# Patient Record
Sex: MALE | Race: WHITE | NOT HISPANIC OR LATINO | Employment: FULL TIME | URBAN - METROPOLITAN AREA
[De-identification: names, ages, dates, MRNs, and addresses within clinical notes are randomized per-mention and may not be internally consistent; named-entity substitution may affect disease eponyms.]

---

## 2018-09-20 ENCOUNTER — OFFICE VISIT (OUTPATIENT)
Dept: ORTHOPEDIC SURGERY | Facility: CLINIC | Age: 28
End: 2018-09-20

## 2018-09-20 VITALS — BODY MASS INDEX: 28.67 KG/M2 | OXYGEN SATURATION: 99 % | HEIGHT: 72 IN | HEART RATE: 67 BPM | WEIGHT: 211.64 LBS

## 2018-09-20 DIAGNOSIS — M75.81 ROTATOR CUFF TENDONITIS, RIGHT: Primary | ICD-10-CM

## 2018-09-20 PROCEDURE — 99203 OFFICE O/P NEW LOW 30 MIN: CPT | Performed by: PHYSICIAN ASSISTANT

## 2018-09-20 PROCEDURE — 20610 DRAIN/INJ JOINT/BURSA W/O US: CPT | Performed by: PHYSICIAN ASSISTANT

## 2018-09-20 RX ADMIN — LIDOCAINE HYDROCHLORIDE 4 ML: 10 INJECTION, SOLUTION INFILTRATION; PERINEURAL at 15:33

## 2018-09-20 RX ADMIN — METHYLPREDNISOLONE ACETATE 40 MG: 40 INJECTION, SUSPENSION INTRA-ARTICULAR; INTRALESIONAL; INTRAMUSCULAR; SOFT TISSUE at 15:33

## 2018-09-20 RX ADMIN — BUPIVACAINE HYDROCHLORIDE 4 ML: 2.5 INJECTION, SOLUTION INFILTRATION; PERINEURAL at 15:33

## 2018-09-20 NOTE — PROGRESS NOTES
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  He complains of pain with abduction greater than 90° and overhead activity.  He reports some radiating arm pain.  He denies any trauma or injury.  She rates he rates the pain to be 6/10.  He describes the pain as aching and dull.  He has functional pain with some mild pain while sleeping.  He is treated with anti-inflammatories with short-term relief.  He is here for further evaluation and treatment recommendation.  No prior problems with the right shoulder.    No current outpatient prescriptions on file prior to visit.     No current facility-administered medications on file prior to visit.       Allergies   Allergen Reactions   • Sulfa Antibiotics Rash      History reviewed. No pertinent past medical history.  Past Surgical History:   Procedure Laterality Date   • TONSILLECTOMY AND ADENOIDECTOMY       Family History   Problem Relation Age of Onset   • Hypertension Father       Social History     Social History   • Marital status: Single     Spouse name: N/A   • Number of children: N/A   • Years of education: N/A     Occupational History   • Not on file.     Social History Main Topics   • Smoking status: Current Some Day Smoker     Packs/day: 0.50     Types: Cigarettes     Start date:    • Smokeless tobacco: Never Used   • Alcohol use Yes      Comment: occasional   • Drug use: No   • Sexual activity: Defer     Other Topics Concern   • Not on file     Social History Narrative   • No narrative on file        Review of Systems   Constitutional: Negative.    HENT: Negative.    Eyes:  "Negative.    Respiratory: Negative.    Cardiovascular: Negative.    Gastrointestinal: Negative.    Endocrine: Negative.    Genitourinary: Negative.    Musculoskeletal: Positive for arthralgias.   Skin: Negative.    Allergic/Immunologic: Negative.    Neurological: Negative.    Hematological: Negative.    Psychiatric/Behavioral: Negative.        The following portions of the patient's history were reviewed and updated as appropriate: allergies, current medications, past family history, past medical history, past social history, past surgical history and problem list.      Objective      Physical Exam  Pulse 67   Ht 182 cm (71.65\")   Wt 96 kg (211 lb 10.3 oz)   SpO2 99%   BMI 28.98 kg/m²     Body mass index is 28.98 kg/m².    GENERAL: Body habitus: normal weight for height    Gait: normal     Mental Status:  awake and alert; oriented to person, place, and time    Voice:  clear  SKIN:  Normal    Hair Growth:  Right:normal; Left:  normal  HEENT: Head: Normocephalic, atraumatic,  without obvious abnormality.  eye: normal external eye, no icterus   PULM:  Repiratory effort normal    Ortho Exam  Musculoskeletal:     Cervical Spine:    ROM:  normal    Tenderness:  none     Right Shoulder    Inspection and Palpation:     Tenderness - anterolateral acromion    Crepitus - none    Sensation is normal    Examination reveals no ecchymosis.       Strength and Tone:    Supraspinatus - 5/5    External Rotators-5/5    Infraspinatus - 5/5    Subscapularis - 5/5    Deltoid - 5/5     Range of Motion    Internal Rotation: ROM - T7    External Rotation: AROM - 80 degrees    Elevation through flexion: AROM - passive range of motion 180 degrees active range of motion 160 pain past 90     Impingement   Right shoulder    Cunningham-Brian impingement test positive    Neer impingement test positive     Musculoskeletal   Upper Extremity   Left Shoulder     Inspection and Palpation:     Tenderness - none    Crepitus - none    Sensation is " normal    Examination reveals no ecchymosis.        Strength and Tone:    Supraspinatus -5/5    External Rotators-5/5    Infraspinatus - 5/5    Subscapularis - 5/5    Deltoid - 5/5     Range of Motion   Left shoulder:    Internal Rotation: ROM - T7    External Rotation: AROM - 80 degrees    Elevation through flexion: AROM - 180 degrees        Medical Decision Making    Data Review:   ordered and reviewed x-rays today    Assessment:  1. Rotator cuff tendonitis, right        Plan:  Right rotator cuff tendinitis.  I reviewed x-rays and clinical findings with the patient.  On exam he has anterolateral acromial tenderness with decreased active range of motion secondary to pain with normal passive range of motion..  He has normal rotator cuff strength.  X-rays show no evidence of degenerative changes, fracture or anything more worrisome.  I think his pain and functional limitations are secondary to rotator cuff tendinitis.  We discussed treatment options including his ago therapy plus or minus subacromial steroid injection.  Patient is been taking oral anti-inflammatories.  He would like to try an injection today as well.  Plan today is right subacromial steroid injection followed by a round of physical therapy working on strength motion and scapular stabilization.  He'll return in 4-6 weeks to see has progressed or sooner if needed.    Using sterile technique, the right shoulderwas sterilely prepped with Hibiclens.  Using a 22 gauge needle, the right subacromial bursa was injected with 40mg Depo Medrol, 4 cc lidocaine and 4 cc marcaine.  Patient tolerated the procedure well. No complications.            Esther Peres PA-C  09/21/18  11:43 AM

## 2018-09-20 NOTE — PROGRESS NOTES
Procedure   Large Joint Arthrocentesis  Date/Time: 9/20/2018 3:33 PM  Consent given by: patient  Site marked: site marked  Timeout: Immediately prior to procedure a time out was called to verify the correct patient, procedure, equipment, support staff and site/side marked as required   Supporting Documentation  Indications: pain   Procedure Details  Location: shoulder - R subacromial bursa  Preparation: Patient was prepped and draped in the usual sterile fashion  Needle size: 22 G  Approach: posterior  Medications administered: 4 mL bupivacaine 0.25 %; 4 mL lidocaine 1 %; 40 mg methylPREDNISolone acetate 40 MG/ML  Patient tolerance: patient tolerated the procedure well with no immediate complications

## 2018-09-21 RX ORDER — METHYLPREDNISOLONE ACETATE 40 MG/ML
40 INJECTION, SUSPENSION INTRA-ARTICULAR; INTRALESIONAL; INTRAMUSCULAR; SOFT TISSUE
Status: COMPLETED | OUTPATIENT
Start: 2018-09-20 | End: 2018-09-20

## 2018-09-21 RX ORDER — BUPIVACAINE HYDROCHLORIDE 2.5 MG/ML
4 INJECTION, SOLUTION INFILTRATION; PERINEURAL
Status: COMPLETED | OUTPATIENT
Start: 2018-09-20 | End: 2018-09-20

## 2018-09-21 RX ORDER — LIDOCAINE HYDROCHLORIDE 10 MG/ML
4 INJECTION, SOLUTION INFILTRATION; PERINEURAL
Status: COMPLETED | OUTPATIENT
Start: 2018-09-20 | End: 2018-09-20

## 2018-10-30 ENCOUNTER — OFFICE VISIT (OUTPATIENT)
Dept: ORTHOPEDIC SURGERY | Facility: CLINIC | Age: 28
End: 2018-10-30

## 2018-10-30 VITALS — BODY MASS INDEX: 28.67 KG/M2 | HEIGHT: 72 IN | OXYGEN SATURATION: 98 % | WEIGHT: 211.64 LBS | HEART RATE: 84 BPM

## 2018-10-30 DIAGNOSIS — M75.81 ROTATOR CUFF TENDONITIS, RIGHT: Primary | ICD-10-CM

## 2018-10-30 PROCEDURE — 99212 OFFICE O/P EST SF 10 MIN: CPT | Performed by: PHYSICIAN ASSISTANT

## 2018-10-30 NOTE — PROGRESS NOTES
[facility] [place] [facility]    Subjective     Patient: Donnie Stoll  : 1990    Primary Care Provider: Ashok Peres MD    Requesting Provider: As above    Follow-up (1 month - Right Rotator cuff tendonitis)      History    Chief Complaint: Follow-up right shoulder    History of Present Illness: Patient returns today for follow-up of his right shoulder rotator cuff tendinitis.  He reports pain is at least 50% better.  He reports steroid injection as proved his pain once he kicked in and about 10 days.  He has not participating in formal physical therapy because he is traveling but says he has been doing some exercises a friend of his that is a physical therapist have given him.  He reports persisting mild pain with overhead activity and lifting.  No new symptoms.    No current outpatient prescriptions on file prior to visit.     No current facility-administered medications on file prior to visit.       Allergies   Allergen Reactions   • Sulfa Antibiotics Rash      History reviewed. No pertinent past medical history.  Past Surgical History:   Procedure Laterality Date   • TONSILLECTOMY AND ADENOIDECTOMY       Family History   Problem Relation Age of Onset   • Hypertension Father       Social History     Social History   • Marital status: Single     Spouse name: N/A   • Number of children: N/A   • Years of education: N/A     Occupational History   • Not on file.     Social History Main Topics   • Smoking status: Current Some Day Smoker     Packs/day: 0.50     Types: Cigarettes     Start date:    • Smokeless tobacco: Never Used   • Alcohol use Yes      Comment: occasional   • Drug use: No   • Sexual activity: Defer     Other Topics Concern   • Not on file     Social History Narrative   • No narrative on file        Review of Systems   Constitutional: Negative.    HENT: Negative.    Eyes: Negative.    Respiratory: Negative.    Cardiovascular: Negative.    Gastrointestinal:  "Negative.    Endocrine: Negative.    Genitourinary: Negative.    Musculoskeletal: Positive for arthralgias.   Skin: Negative.    Allergic/Immunologic: Negative.    Neurological: Negative.    Hematological: Negative.    Psychiatric/Behavioral: Negative.        The following portions of the patient's history were reviewed and updated as appropriate: allergies, current medications, past family history, past medical history, past social history, past surgical history and problem list.      Objective      Physical Exam  Pulse 84   Ht 182 cm (71.65\")   Wt 96 kg (211 lb 10.3 oz)   SpO2 98%   BMI 28.98 kg/m²     Body mass index is 28.98 kg/m².    Patient is well nourished and well developed.      Ortho Exam  Musculoskeletal   Upper Extremity   Right Shoulder     Inspection and Palpation:     Tenderness - mild tenderness over the insertion of the deltoid    Crepitus - none    Sensation is normal    Examination reveals no ecchymosis.        Strength and Tone:    Supraspinatus -5/5    External Rotators-5/5    Infraspinatus - 5/5    Subscapularis - 5/5    Deltoid - 5/5     Range of Motion   Left shoulder:    Internal Rotation: ROM - T7    External Rotation: AROM - 80 degrees    Elevation through flexion: AROM - 180 degrees    Right Shoulder:    Internal Rotation: ROM - T7    External Rotation: AROM - 80 degrees    Elevation through flexion: AROM - 180 degrees        Medical Decision Making    Data Review:   none    Assessment:  1. Rotator cuff tendonitis, right        Plan:  Right rotator cuff tendinitis 50% improved pain with subacromial injection.  I reviewed clinical findings, past and current treatment with the patient.  We discussed the importance of physical therapy to help resolve the remainder of his pain.  We also discussed further treatment options including continued strengthening and activity modification versus MRI.  Patient would like to get involved with therapy and avoid MRI at this time.  He'll return to " see us as needed.  Should pain persist and he would like to go forth with an MRI, he will call and I can put in the order.      Esther Peres PA-C  10/31/18  9:09 AM

## 2024-09-24 ENCOUNTER — TRANSCRIBE ORDERS (OUTPATIENT)
Dept: ADMINISTRATIVE | Facility: HOSPITAL | Age: 34
End: 2024-09-24
Payer: COMMERCIAL